# Patient Record
Sex: FEMALE
[De-identification: names, ages, dates, MRNs, and addresses within clinical notes are randomized per-mention and may not be internally consistent; named-entity substitution may affect disease eponyms.]

---

## 2018-10-23 ENCOUNTER — HOSPITAL ENCOUNTER (EMERGENCY)
Dept: HOSPITAL 9 - MADERS | Age: 43
Discharge: HOME | End: 2018-10-23
Payer: COMMERCIAL

## 2018-10-23 DIAGNOSIS — F41.9: Primary | ICD-10-CM

## 2018-10-23 DIAGNOSIS — F17.210: ICD-10-CM

## 2018-10-23 LAB
ALBUMIN SERPL BCG-MCNC: 4.5 G/DL (ref 3.5–5)
ALP SERPL-CCNC: 82 U/L (ref 40–150)
ALT SERPL W P-5'-P-CCNC: 30 U/L (ref 8–55)
ANION GAP SERPL CALC-SCNC: 14 MMOL/L (ref 10–20)
AST SERPL-CCNC: 18 U/L (ref 5–34)
BASOPHILS # BLD AUTO: 0.1 THOU/UL (ref 0–0.2)
BASOPHILS NFR BLD AUTO: 1 % (ref 0–1)
BILIRUB SERPL-MCNC: 0.4 MG/DL (ref 0.2–1.2)
BUN SERPL-MCNC: 10 MG/DL (ref 7–18.7)
CALCIUM SERPL-MCNC: 9.6 MG/DL (ref 7.8–10.44)
CHLORIDE SERPL-SCNC: 107 MMOL/L (ref 98–107)
CK MB SERPL-MCNC: 1.8 NG/ML (ref 0–6.6)
CO2 SERPL-SCNC: 22 MMOL/L (ref 22–29)
CREAT CL PREDICTED SERPL C-G-VRATE: 0 ML/MIN (ref 70–130)
EOSINOPHIL # BLD AUTO: 0.1 THOU/UL (ref 0–0.7)
EOSINOPHIL NFR BLD AUTO: 0.8 % (ref 0–10)
GLOBULIN SER CALC-MCNC: 2.9 G/DL (ref 2.4–3.5)
GLUCOSE SERPL-MCNC: 91 MG/DL (ref 70–105)
HGB BLD-MCNC: 17.4 G/DL (ref 12–16)
LYMPHOCYTES # BLD AUTO: 2.5 THOU/UL (ref 1.2–3.4)
LYMPHOCYTES NFR BLD AUTO: 34.1 % (ref 21–51)
MCH RBC QN AUTO: 33.9 PG (ref 27–31)
MCV RBC AUTO: 94.5 FL (ref 78–98)
MONOCYTES # BLD AUTO: 0.5 THOU/UL (ref 0.11–0.59)
MONOCYTES NFR BLD AUTO: 6.2 % (ref 0–10)
NEUTROPHILS # BLD AUTO: 4.2 THOU/UL (ref 1.4–6.5)
NEUTROPHILS NFR BLD AUTO: 57.9 % (ref 42–75)
PLATELET # BLD AUTO: 342 THOU/UL (ref 130–400)
POTASSIUM SERPL-SCNC: 4 MMOL/L (ref 3.5–5.1)
RBC # BLD AUTO: 5.14 MILL/UL (ref 4.2–5.4)
SODIUM SERPL-SCNC: 139 MMOL/L (ref 136–145)
TROPONIN I SERPL DL<=0.01 NG/ML-MCNC: (no result) NG/ML (ref ?–0.03)
WBC # BLD AUTO: 7.2 THOU/UL (ref 4.8–10.8)

## 2018-10-23 PROCEDURE — 84484 ASSAY OF TROPONIN QUANT: CPT

## 2018-10-23 PROCEDURE — 93005 ELECTROCARDIOGRAM TRACING: CPT

## 2018-10-23 PROCEDURE — 94760 N-INVAS EAR/PLS OXIMETRY 1: CPT

## 2018-10-23 PROCEDURE — 99406 BEHAV CHNG SMOKING 3-10 MIN: CPT

## 2018-10-23 PROCEDURE — 85025 COMPLETE CBC W/AUTO DIFF WBC: CPT

## 2018-10-23 PROCEDURE — 71045 X-RAY EXAM CHEST 1 VIEW: CPT

## 2018-10-23 PROCEDURE — 96374 THER/PROPH/DIAG INJ IV PUSH: CPT

## 2018-10-23 PROCEDURE — 96361 HYDRATE IV INFUSION ADD-ON: CPT

## 2018-10-23 PROCEDURE — 80053 COMPREHEN METABOLIC PANEL: CPT

## 2018-10-23 PROCEDURE — 82553 CREATINE MB FRACTION: CPT

## 2018-10-23 NOTE — RAD
AP CHEST:

 

Date:  10/23/18 

 

HISTORY:  

Chest pain. 

 

FINDINGS:

Lung fields are clear. Heart and mediastinum normal. Vasculature normal. 

 

IMPRESSION: 

Unremarkable chest. 

 

 

POS: [facility]

## 2022-08-25 ENCOUNTER — HOSPITAL ENCOUNTER (EMERGENCY)
Dept: HOSPITAL 9 - MADERS | Age: 47
Discharge: HOME | End: 2022-08-25
Payer: COMMERCIAL

## 2022-08-25 DIAGNOSIS — F17.210: ICD-10-CM

## 2022-08-25 DIAGNOSIS — Z00.00: Primary | ICD-10-CM

## 2022-08-25 PROCEDURE — 99282 EMERGENCY DEPT VISIT SF MDM: CPT

## 2022-08-26 ENCOUNTER — HOSPITAL ENCOUNTER (EMERGENCY)
Dept: HOSPITAL 9 - MADERS | Age: 47
Discharge: HOME | End: 2022-08-26
Payer: SELF-PAY

## 2022-08-26 DIAGNOSIS — Z23: ICD-10-CM

## 2022-08-26 DIAGNOSIS — F17.210: ICD-10-CM

## 2022-08-26 DIAGNOSIS — Z20.3: Primary | ICD-10-CM

## 2022-08-26 PROCEDURE — 90471 IMMUNIZATION ADMIN: CPT

## 2022-08-26 PROCEDURE — 90675 RABIES VACCINE IM: CPT

## 2022-08-29 ENCOUNTER — HOSPITAL ENCOUNTER (EMERGENCY)
Dept: HOSPITAL 9 - MADERS | Age: 47
Discharge: HOME | End: 2022-08-29
Payer: SELF-PAY

## 2022-08-29 DIAGNOSIS — Z20.3: Primary | ICD-10-CM

## 2022-08-29 DIAGNOSIS — Z23: ICD-10-CM

## 2022-08-29 PROCEDURE — 90675 RABIES VACCINE IM: CPT

## 2022-08-29 PROCEDURE — 90376 RABIES IG HEAT TREATED: CPT

## 2022-08-29 PROCEDURE — 90471 IMMUNIZATION ADMIN: CPT

## 2022-09-02 ENCOUNTER — HOSPITAL ENCOUNTER (OUTPATIENT)
Dept: HOSPITAL 9 - MADERS | Age: 47
Discharge: HOME | End: 2022-09-02
Payer: SELF-PAY

## 2022-09-02 DIAGNOSIS — Z23: Primary | ICD-10-CM

## 2022-09-02 PROCEDURE — 90675 RABIES VACCINE IM: CPT

## 2022-09-09 ENCOUNTER — HOSPITAL ENCOUNTER (OUTPATIENT)
Dept: HOSPITAL 9 - MADERS | Age: 47
Discharge: HOME | End: 2022-09-09
Payer: SELF-PAY

## 2022-09-09 DIAGNOSIS — Z23: Primary | ICD-10-CM

## 2022-09-09 PROCEDURE — 90675 RABIES VACCINE IM: CPT

## 2023-10-13 ENCOUNTER — HOSPITAL ENCOUNTER (OUTPATIENT)
Dept: HOSPITAL 92 - CSHMRI | Age: 48
Discharge: HOME | End: 2023-10-13
Payer: COMMERCIAL

## 2023-10-13 DIAGNOSIS — M51.34: ICD-10-CM

## 2023-10-13 DIAGNOSIS — M54.31: Primary | ICD-10-CM

## 2023-10-13 DIAGNOSIS — M47.815: ICD-10-CM

## 2023-10-13 DIAGNOSIS — M47.814: ICD-10-CM

## 2023-10-13 PROCEDURE — 72148 MRI LUMBAR SPINE W/O DYE: CPT

## 2023-10-13 PROCEDURE — 72146 MRI CHEST SPINE W/O DYE: CPT

## 2023-12-08 ENCOUNTER — HOSPITAL ENCOUNTER (OUTPATIENT)
Dept: HOSPITAL 92 - SCSRAD | Age: 48
Discharge: HOME | End: 2023-12-08
Attending: NEUROLOGICAL SURGERY
Payer: COMMERCIAL

## 2023-12-08 DIAGNOSIS — Q76.2: Primary | ICD-10-CM

## 2023-12-08 DIAGNOSIS — M47.815: ICD-10-CM

## 2023-12-08 PROCEDURE — 72100 X-RAY EXAM L-S SPINE 2/3 VWS: CPT
